# Patient Record
Sex: FEMALE | Race: BLACK OR AFRICAN AMERICAN | NOT HISPANIC OR LATINO
[De-identification: names, ages, dates, MRNs, and addresses within clinical notes are randomized per-mention and may not be internally consistent; named-entity substitution may affect disease eponyms.]

---

## 2017-04-20 PROBLEM — S83.209A TORN MENISCUS: Status: RESOLVED | Noted: 2017-04-20 | Resolved: 2017-04-20

## 2017-04-20 PROBLEM — M19.90 ARTHRITIS: Status: ACTIVE | Noted: 2017-04-20

## 2017-04-20 PROBLEM — K21.9 ACID REFLUX: Status: ACTIVE | Noted: 2017-04-20

## 2017-04-20 PROBLEM — Z86.2 HISTORY OF ANEMIA: Status: RESOLVED | Noted: 2017-04-20 | Resolved: 2017-04-20

## 2017-04-20 PROBLEM — Z87.898 HISTORY OF PREDIABETES: Status: RESOLVED | Noted: 2017-04-20 | Resolved: 2017-04-20

## 2017-04-20 PROBLEM — Z82.49 FAMILY HISTORY OF HYPERTENSION: Status: ACTIVE | Noted: 2017-04-20

## 2017-04-20 PROBLEM — R60.0 LOWER EXTREMITY EDEMA: Status: ACTIVE | Noted: 2017-04-20

## 2017-04-20 PROBLEM — F43.10 PTSD (POST-TRAUMATIC STRESS DISORDER): Status: RESOLVED | Noted: 2017-04-20 | Resolved: 2017-04-20

## 2017-04-20 PROBLEM — Z83.3 FAMILY HISTORY OF DIABETES MELLITUS: Status: ACTIVE | Noted: 2017-04-20

## 2017-04-20 RX ORDER — OMEPRAZOLE MAGNESIUM 20 MG/1
20 CAPSULE, DELAYED RELEASE ORAL
Refills: 0 | Status: ACTIVE | COMMUNITY

## 2017-04-24 ENCOUNTER — APPOINTMENT (OUTPATIENT)
Dept: SURGERY | Facility: CLINIC | Age: 37
End: 2017-04-24
Payer: COMMERCIAL

## 2017-04-24 VITALS
HEART RATE: 65 BPM | RESPIRATION RATE: 16 BRPM | TEMPERATURE: 98.3 F | SYSTOLIC BLOOD PRESSURE: 112 MMHG | BODY MASS INDEX: 44.1 KG/M2 | DIASTOLIC BLOOD PRESSURE: 76 MMHG | HEIGHT: 68 IN | OXYGEN SATURATION: 98 % | WEIGHT: 291 LBS

## 2017-04-24 DIAGNOSIS — F43.10 POST-TRAUMATIC STRESS DISORDER, UNSPECIFIED: ICD-10-CM

## 2017-04-24 DIAGNOSIS — M54.5 LOW BACK PAIN: ICD-10-CM

## 2017-04-24 DIAGNOSIS — R60.0 LOCALIZED EDEMA: ICD-10-CM

## 2017-04-24 DIAGNOSIS — Z82.49 FAMILY HISTORY OF ISCHEMIC HEART DISEASE AND OTHER DISEASES OF THE CIRCULATORY SYSTEM: ICD-10-CM

## 2017-04-24 DIAGNOSIS — S83.209A UNSPECIFIED TEAR OF UNSPECIFIED MENISCUS, CURRENT INJURY, UNSPECIFIED KNEE, INITIAL ENCOUNTER: ICD-10-CM

## 2017-04-24 DIAGNOSIS — M19.90 UNSPECIFIED OSTEOARTHRITIS, UNSPECIFIED SITE: ICD-10-CM

## 2017-04-24 DIAGNOSIS — Z83.3 FAMILY HISTORY OF DIABETES MELLITUS: ICD-10-CM

## 2017-04-24 DIAGNOSIS — Z86.2 PERSONAL HISTORY OF DISEASES OF THE BLOOD AND BLOOD-FORMING ORGANS AND CERTAIN DISORDERS INVOLVING THE IMMUNE MECHANISM: ICD-10-CM

## 2017-04-24 DIAGNOSIS — K21.9 GASTRO-ESOPHAGEAL REFLUX DISEASE W/OUT ESOPHAGITIS: ICD-10-CM

## 2017-04-24 DIAGNOSIS — Z87.898 PERSONAL HISTORY OF OTHER SPECIFIED CONDITIONS: ICD-10-CM

## 2017-04-24 PROCEDURE — 99245 OFF/OP CONSLTJ NEW/EST HI 55: CPT

## 2017-08-18 ENCOUNTER — APPOINTMENT (OUTPATIENT)
Dept: SURGERY | Facility: CLINIC | Age: 37
End: 2017-08-18
Payer: COMMERCIAL

## 2017-08-18 ENCOUNTER — OUTPATIENT (OUTPATIENT)
Dept: OUTPATIENT SERVICES | Facility: HOSPITAL | Age: 37
LOS: 1 days | End: 2017-08-18
Payer: COMMERCIAL

## 2017-08-18 VITALS
HEART RATE: 84 BPM | RESPIRATION RATE: 16 BRPM | HEIGHT: 68 IN | DIASTOLIC BLOOD PRESSURE: 80 MMHG | TEMPERATURE: 98 F | WEIGHT: 276.9 LBS | OXYGEN SATURATION: 99 % | SYSTOLIC BLOOD PRESSURE: 120 MMHG

## 2017-08-18 DIAGNOSIS — Z98.890 OTHER SPECIFIED POSTPROCEDURAL STATES: Chronic | ICD-10-CM

## 2017-08-18 DIAGNOSIS — E66.01 MORBID (SEVERE) OBESITY DUE TO EXCESS CALORIES: ICD-10-CM

## 2017-08-18 DIAGNOSIS — Z01.818 ENCOUNTER FOR OTHER PREPROCEDURAL EXAMINATION: ICD-10-CM

## 2017-08-18 DIAGNOSIS — Z98.891 HISTORY OF UTERINE SCAR FROM PREVIOUS SURGERY: Chronic | ICD-10-CM

## 2017-08-18 LAB
ALBUMIN SERPL ELPH-MCNC: 4.1 G/DL — SIGNIFICANT CHANGE UP (ref 3.3–5)
ALP SERPL-CCNC: 73 U/L — SIGNIFICANT CHANGE UP (ref 40–120)
ALT FLD-CCNC: 9 U/L — LOW (ref 10–45)
ANION GAP SERPL CALC-SCNC: 15 MMOL/L — SIGNIFICANT CHANGE UP (ref 5–17)
AST SERPL-CCNC: 18 U/L — SIGNIFICANT CHANGE UP (ref 10–40)
BILIRUB SERPL-MCNC: 0.2 MG/DL — SIGNIFICANT CHANGE UP (ref 0.2–1.2)
BLD GP AB SCN SERPL QL: NEGATIVE — SIGNIFICANT CHANGE UP
BUN SERPL-MCNC: 26 MG/DL — HIGH (ref 7–23)
CALCIUM SERPL-MCNC: 9.5 MG/DL — SIGNIFICANT CHANGE UP (ref 8.4–10.5)
CHLORIDE SERPL-SCNC: 97 MMOL/L — SIGNIFICANT CHANGE UP (ref 96–108)
CO2 SERPL-SCNC: 23 MMOL/L — SIGNIFICANT CHANGE UP (ref 22–31)
CREAT SERPL-MCNC: 1.48 MG/DL — HIGH (ref 0.5–1.3)
GLUCOSE SERPL-MCNC: 81 MG/DL — SIGNIFICANT CHANGE UP (ref 70–99)
HBA1C BLD-MCNC: 5.6 % — SIGNIFICANT CHANGE UP (ref 4–5.6)
HCG SERPL-ACNC: <1 MIU/ML — SIGNIFICANT CHANGE UP
HCT VFR BLD CALC: 40.1 % — SIGNIFICANT CHANGE UP (ref 34.5–45)
HGB BLD-MCNC: 13.1 G/DL — SIGNIFICANT CHANGE UP (ref 11.5–15.5)
MCHC RBC-ENTMCNC: 28.1 PG — SIGNIFICANT CHANGE UP (ref 27–34)
MCHC RBC-ENTMCNC: 32.7 GM/DL — SIGNIFICANT CHANGE UP (ref 32–36)
MCV RBC AUTO: 85.9 FL — SIGNIFICANT CHANGE UP (ref 80–100)
PLATELET # BLD AUTO: 442 K/UL — HIGH (ref 150–400)
POTASSIUM SERPL-MCNC: 4.4 MMOL/L — SIGNIFICANT CHANGE UP (ref 3.5–5.3)
POTASSIUM SERPL-SCNC: 4.4 MMOL/L — SIGNIFICANT CHANGE UP (ref 3.5–5.3)
PROT SERPL-MCNC: 8.6 G/DL — HIGH (ref 6–8.3)
RBC # BLD: 4.67 M/UL — SIGNIFICANT CHANGE UP (ref 3.8–5.2)
RBC # FLD: 15.4 % — HIGH (ref 10.3–14.5)
RH IG SCN BLD-IMP: POSITIVE — SIGNIFICANT CHANGE UP
SODIUM SERPL-SCNC: 135 MMOL/L — SIGNIFICANT CHANGE UP (ref 135–145)
WBC # BLD: 7.21 K/UL — SIGNIFICANT CHANGE UP (ref 3.8–10.5)
WBC # FLD AUTO: 7.21 K/UL — SIGNIFICANT CHANGE UP (ref 3.8–10.5)

## 2017-08-18 PROCEDURE — 83036 HEMOGLOBIN GLYCOSYLATED A1C: CPT

## 2017-08-18 PROCEDURE — G0463: CPT

## 2017-08-18 PROCEDURE — 99215 OFFICE O/P EST HI 40 MIN: CPT

## 2017-08-18 PROCEDURE — 84702 CHORIONIC GONADOTROPIN TEST: CPT

## 2017-08-18 PROCEDURE — 80053 COMPREHEN METABOLIC PANEL: CPT

## 2017-08-18 PROCEDURE — 86901 BLOOD TYPING SEROLOGIC RH(D): CPT

## 2017-08-18 PROCEDURE — 86900 BLOOD TYPING SEROLOGIC ABO: CPT

## 2017-08-18 PROCEDURE — 86850 RBC ANTIBODY SCREEN: CPT

## 2017-08-18 PROCEDURE — 85027 COMPLETE CBC AUTOMATED: CPT

## 2017-08-18 RX ORDER — CEFAZOLIN SODIUM 1 G
3000 VIAL (EA) INJECTION ONCE
Qty: 0 | Refills: 0 | Status: DISCONTINUED | OUTPATIENT
Start: 2017-08-23 | End: 2017-08-24

## 2017-08-18 NOTE — H&P PST ADULT - HISTORY OF PRESENT ILLNESS
37 year old female with h/o morbid obesity, GERD, OA- attempted multiple weight loss programs. Pt had surgical consult- scheduled for laparoscopic vertical sleeve gastrectomy on 08/23/2017.

## 2017-08-18 NOTE — H&P PST ADULT - PMH
Gastroesophageal reflux disease, esophagitis presence not specified    Morbid obesity due to excess calories    Osteoarthritis of left knee, unspecified osteoarthritis type

## 2017-08-18 NOTE — H&P PST ADULT - NSANTHOSAYNRD_GEN_A_CORE
No. TERENCE screening performed.  STOP BANG Legend: 0-2 = LOW Risk; 3-4 = INTERMEDIATE Risk; 5-8 = HIGH Risk

## 2017-08-23 ENCOUNTER — TRANSCRIPTION ENCOUNTER (OUTPATIENT)
Age: 37
End: 2017-08-23

## 2017-08-23 ENCOUNTER — RESULT REVIEW (OUTPATIENT)
Age: 37
End: 2017-08-23

## 2017-08-23 ENCOUNTER — INPATIENT (INPATIENT)
Facility: HOSPITAL | Age: 37
LOS: 0 days | Discharge: HOME | DRG: 621 | End: 2017-08-24
Attending: SURGERY | Admitting: SURGERY
Payer: COMMERCIAL

## 2017-08-23 ENCOUNTER — APPOINTMENT (OUTPATIENT)
Dept: SURGERY | Facility: HOSPITAL | Age: 37
End: 2017-08-23
Payer: COMMERCIAL

## 2017-08-23 VITALS
HEART RATE: 87 BPM | SYSTOLIC BLOOD PRESSURE: 126 MMHG | RESPIRATION RATE: 18 BRPM | WEIGHT: 267.42 LBS | OXYGEN SATURATION: 97 % | DIASTOLIC BLOOD PRESSURE: 79 MMHG | HEIGHT: 63 IN | TEMPERATURE: 98 F

## 2017-08-23 DIAGNOSIS — Z98.890 OTHER SPECIFIED POSTPROCEDURAL STATES: Chronic | ICD-10-CM

## 2017-08-23 DIAGNOSIS — E66.01 MORBID (SEVERE) OBESITY DUE TO EXCESS CALORIES: ICD-10-CM

## 2017-08-23 DIAGNOSIS — Z98.891 HISTORY OF UTERINE SCAR FROM PREVIOUS SURGERY: Chronic | ICD-10-CM

## 2017-08-23 LAB
ANION GAP SERPL CALC-SCNC: 17 MMOL/L — SIGNIFICANT CHANGE UP (ref 5–17)
BASOPHILS # BLD AUTO: 0.1 K/UL — SIGNIFICANT CHANGE UP (ref 0–0.2)
BASOPHILS NFR BLD AUTO: 0.9 % — SIGNIFICANT CHANGE UP (ref 0–2)
BUN SERPL-MCNC: 13 MG/DL — SIGNIFICANT CHANGE UP (ref 7–23)
CALCIUM SERPL-MCNC: 8.6 MG/DL — SIGNIFICANT CHANGE UP (ref 8.4–10.5)
CHLORIDE SERPL-SCNC: 101 MMOL/L — SIGNIFICANT CHANGE UP (ref 96–108)
CO2 SERPL-SCNC: 20 MMOL/L — LOW (ref 22–31)
CREAT SERPL-MCNC: 0.95 MG/DL — SIGNIFICANT CHANGE UP (ref 0.5–1.3)
EOSINOPHIL # BLD AUTO: 0.1 K/UL — SIGNIFICANT CHANGE UP (ref 0–0.5)
EOSINOPHIL NFR BLD AUTO: 0.6 % — SIGNIFICANT CHANGE UP (ref 0–6)
GLUCOSE SERPL-MCNC: 128 MG/DL — HIGH (ref 70–99)
HCG UR QL: NEGATIVE — SIGNIFICANT CHANGE UP
HCT VFR BLD CALC: 37.3 % — SIGNIFICANT CHANGE UP (ref 34.5–45)
HGB BLD-MCNC: 12.4 G/DL — SIGNIFICANT CHANGE UP (ref 11.5–15.5)
LYMPHOCYTES # BLD AUTO: 28.1 % — SIGNIFICANT CHANGE UP (ref 13–44)
LYMPHOCYTES # BLD AUTO: 3.5 K/UL — HIGH (ref 1–3.3)
MAGNESIUM SERPL-MCNC: 2 MG/DL — SIGNIFICANT CHANGE UP (ref 1.6–2.6)
MCHC RBC-ENTMCNC: 30 PG — SIGNIFICANT CHANGE UP (ref 27–34)
MCHC RBC-ENTMCNC: 33.3 GM/DL — SIGNIFICANT CHANGE UP (ref 32–36)
MCV RBC AUTO: 90 FL — SIGNIFICANT CHANGE UP (ref 80–100)
MONOCYTES # BLD AUTO: 0.3 K/UL — SIGNIFICANT CHANGE UP (ref 0–0.9)
MONOCYTES NFR BLD AUTO: 2.5 % — SIGNIFICANT CHANGE UP (ref 2–14)
NEUTROPHILS # BLD AUTO: 8.5 K/UL — HIGH (ref 1.8–7.4)
NEUTROPHILS NFR BLD AUTO: 67.9 % — SIGNIFICANT CHANGE UP (ref 43–77)
PHOSPHATE SERPL-MCNC: 3.1 MG/DL — SIGNIFICANT CHANGE UP (ref 2.5–4.5)
PLATELET # BLD AUTO: 351 K/UL — SIGNIFICANT CHANGE UP (ref 150–400)
POTASSIUM SERPL-MCNC: 3.9 MMOL/L — SIGNIFICANT CHANGE UP (ref 3.5–5.3)
POTASSIUM SERPL-SCNC: 3.9 MMOL/L — SIGNIFICANT CHANGE UP (ref 3.5–5.3)
RBC # BLD: 4.14 M/UL — SIGNIFICANT CHANGE UP (ref 3.8–5.2)
RBC # FLD: 13.8 % — SIGNIFICANT CHANGE UP (ref 10.3–14.5)
RH IG SCN BLD-IMP: POSITIVE — SIGNIFICANT CHANGE UP
SODIUM SERPL-SCNC: 138 MMOL/L — SIGNIFICANT CHANGE UP (ref 135–145)
WBC # BLD: 12.6 K/UL — HIGH (ref 3.8–10.5)
WBC # FLD AUTO: 12.6 K/UL — HIGH (ref 3.8–10.5)

## 2017-08-23 PROCEDURE — 49585: CPT | Mod: 59

## 2017-08-23 PROCEDURE — 43775 LAP SLEEVE GASTRECTOMY: CPT

## 2017-08-23 PROCEDURE — 88305 TISSUE EXAM BY PATHOLOGIST: CPT | Mod: 26

## 2017-08-23 RX ORDER — HEPARIN SODIUM 5000 [USP'U]/ML
5000 INJECTION INTRAVENOUS; SUBCUTANEOUS ONCE
Qty: 0 | Refills: 0 | Status: COMPLETED | OUTPATIENT
Start: 2017-08-23 | End: 2017-08-23

## 2017-08-23 RX ORDER — CEFAZOLIN SODIUM 1 G
3000 VIAL (EA) INJECTION EVERY 8 HOURS
Qty: 0 | Refills: 0 | Status: COMPLETED | OUTPATIENT
Start: 2017-08-23 | End: 2017-08-23

## 2017-08-23 RX ORDER — POTASSIUM CHLORIDE 20 MEQ
10 PACKET (EA) ORAL
Qty: 0 | Refills: 0 | Status: COMPLETED | OUTPATIENT
Start: 2017-08-23 | End: 2017-08-23

## 2017-08-23 RX ORDER — ONDANSETRON 8 MG/1
4 TABLET, FILM COATED ORAL EVERY 6 HOURS
Qty: 0 | Refills: 0 | Status: DISCONTINUED | OUTPATIENT
Start: 2017-08-23 | End: 2017-08-24

## 2017-08-23 RX ORDER — SODIUM CHLORIDE 9 MG/ML
3 INJECTION INTRAMUSCULAR; INTRAVENOUS; SUBCUTANEOUS EVERY 8 HOURS
Qty: 0 | Refills: 0 | Status: DISCONTINUED | OUTPATIENT
Start: 2017-08-23 | End: 2017-08-23

## 2017-08-23 RX ORDER — HYOSCYAMINE SULFATE 0.13 MG
0.12 TABLET ORAL EVERY 6 HOURS
Qty: 0 | Refills: 0 | Status: DISCONTINUED | OUTPATIENT
Start: 2017-08-23 | End: 2017-08-24

## 2017-08-23 RX ORDER — KETOROLAC TROMETHAMINE 30 MG/ML
30 SYRINGE (ML) INJECTION EVERY 6 HOURS
Qty: 0 | Refills: 0 | Status: DISCONTINUED | OUTPATIENT
Start: 2017-08-23 | End: 2017-08-23

## 2017-08-23 RX ORDER — ACETAMINOPHEN 500 MG
1000 TABLET ORAL ONCE
Qty: 0 | Refills: 0 | Status: COMPLETED | OUTPATIENT
Start: 2017-08-23 | End: 2017-08-24

## 2017-08-23 RX ORDER — HYDROMORPHONE HYDROCHLORIDE 2 MG/ML
0.51 INJECTION INTRAMUSCULAR; INTRAVENOUS; SUBCUTANEOUS
Qty: 0 | Refills: 0 | Status: DISCONTINUED | OUTPATIENT
Start: 2017-08-23 | End: 2017-08-23

## 2017-08-23 RX ORDER — HEPARIN SODIUM 5000 [USP'U]/ML
5000 INJECTION INTRAVENOUS; SUBCUTANEOUS EVERY 8 HOURS
Qty: 0 | Refills: 0 | Status: DISCONTINUED | OUTPATIENT
Start: 2017-08-23 | End: 2017-08-24

## 2017-08-23 RX ORDER — PANTOPRAZOLE SODIUM 20 MG/1
40 TABLET, DELAYED RELEASE ORAL DAILY
Qty: 0 | Refills: 0 | Status: DISCONTINUED | OUTPATIENT
Start: 2017-08-23 | End: 2017-08-24

## 2017-08-23 RX ORDER — ACETAMINOPHEN 500 MG
1000 TABLET ORAL ONCE
Qty: 0 | Refills: 0 | Status: COMPLETED | OUTPATIENT
Start: 2017-08-23 | End: 2017-08-23

## 2017-08-23 RX ORDER — HYDROMORPHONE HYDROCHLORIDE 2 MG/ML
0.5 INJECTION INTRAMUSCULAR; INTRAVENOUS; SUBCUTANEOUS
Qty: 0 | Refills: 0 | Status: DISCONTINUED | OUTPATIENT
Start: 2017-08-23 | End: 2017-08-24

## 2017-08-23 RX ORDER — SODIUM CHLORIDE 9 MG/ML
1000 INJECTION, SOLUTION INTRAVENOUS
Qty: 0 | Refills: 0 | Status: DISCONTINUED | OUTPATIENT
Start: 2017-08-23 | End: 2017-08-24

## 2017-08-23 RX ADMIN — HEPARIN SODIUM 5000 UNIT(S): 5000 INJECTION INTRAVENOUS; SUBCUTANEOUS at 14:11

## 2017-08-23 RX ADMIN — Medication 0.12 MILLIGRAM(S): at 23:59

## 2017-08-23 RX ADMIN — Medication 200 MILLIGRAM(S): at 23:59

## 2017-08-23 RX ADMIN — Medication 0.12 MILLIGRAM(S): at 17:19

## 2017-08-23 RX ADMIN — HYDROMORPHONE HYDROCHLORIDE 0.5 MILLIGRAM(S): 2 INJECTION INTRAMUSCULAR; INTRAVENOUS; SUBCUTANEOUS at 12:37

## 2017-08-23 RX ADMIN — Medication 30 MILLIGRAM(S): at 15:17

## 2017-08-23 RX ADMIN — HYDROMORPHONE HYDROCHLORIDE 0.51 MILLIGRAM(S): 2 INJECTION INTRAMUSCULAR; INTRAVENOUS; SUBCUTANEOUS at 09:55

## 2017-08-23 RX ADMIN — HEPARIN SODIUM 5000 UNIT(S): 5000 INJECTION INTRAVENOUS; SUBCUTANEOUS at 06:09

## 2017-08-23 RX ADMIN — ONDANSETRON 4 MILLIGRAM(S): 8 TABLET, FILM COATED ORAL at 11:37

## 2017-08-23 RX ADMIN — Medication 30 MILLIGRAM(S): at 15:12

## 2017-08-23 RX ADMIN — ONDANSETRON 4 MILLIGRAM(S): 8 TABLET, FILM COATED ORAL at 17:19

## 2017-08-23 RX ADMIN — Medication 200 MILLIGRAM(S): at 16:40

## 2017-08-23 RX ADMIN — Medication 400 MILLIGRAM(S): at 15:12

## 2017-08-23 RX ADMIN — PANTOPRAZOLE SODIUM 40 MILLIGRAM(S): 20 TABLET, DELAYED RELEASE ORAL at 11:37

## 2017-08-23 RX ADMIN — HYDROMORPHONE HYDROCHLORIDE 0.5 MILLIGRAM(S): 2 INJECTION INTRAMUSCULAR; INTRAVENOUS; SUBCUTANEOUS at 13:00

## 2017-08-23 RX ADMIN — Medication 400 MILLIGRAM(S): at 22:12

## 2017-08-23 RX ADMIN — Medication 1000 MILLIGRAM(S): at 22:38

## 2017-08-23 RX ADMIN — HEPARIN SODIUM 5000 UNIT(S): 5000 INJECTION INTRAVENOUS; SUBCUTANEOUS at 22:12

## 2017-08-23 RX ADMIN — SODIUM CHLORIDE 150 MILLILITER(S): 9 INJECTION, SOLUTION INTRAVENOUS at 10:06

## 2017-08-23 RX ADMIN — ONDANSETRON 4 MILLIGRAM(S): 8 TABLET, FILM COATED ORAL at 23:59

## 2017-08-23 RX ADMIN — Medication 0.12 MILLIGRAM(S): at 11:37

## 2017-08-23 RX ADMIN — Medication 1000 MILLIGRAM(S): at 15:16

## 2017-08-23 RX ADMIN — HYDROMORPHONE HYDROCHLORIDE 0.51 MILLIGRAM(S): 2 INJECTION INTRAMUSCULAR; INTRAVENOUS; SUBCUTANEOUS at 10:25

## 2017-08-23 NOTE — PHARMACY COMMUNICATION NOTE - COMMENTS
Patient medication reconciliation done. Patient currently taking: no medications .Patient was re-educated to take daily multi-vitamins post surgically. Patient re-educated on NSAID avoidance (Ibuprofen, ASA, naproxen, alleve) as they increased risk of GI bleeding. Patient educated to use APAP for mild pain otherwise contact prescriber for consult. Patient medication reconciliation done. Patient currently taking: Probiotic capsule. Patient was re-educated to take daily multi-vitamins post surgically. Patient re-educated on NSAID avoidance (Ibuprofen, ASA, naproxen, alleve) as they increased risk of GI bleeding. Patient educated to use APAP for mild pain otherwise contact prescriber for consult.  Patient advised to either open probiotic capsules and sprinkle content on to sugar free applesauce or stop it for 1 month

## 2017-08-23 NOTE — BRIEF OPERATIVE NOTE - OPERATION/FINDINGS
Sleeve gastrectomy performed over 36-Fr calibration tube. Reducible umbilical hernia repaired primarily.

## 2017-08-23 NOTE — PROGRESS NOTE ADULT - ASSESSMENT
36yo F POD 0 s/p lap vertical sleeve and umbilical hernia repair    -Pain control  -bariatric medications  -IVF  -CLD in AM  -Plan on removing khalil if UO continues to be adequate  -OOB/ISS  -DVT ppx  -AM labs  -transfer to floor when PACU criteria met

## 2017-08-24 ENCOUNTER — TRANSCRIPTION ENCOUNTER (OUTPATIENT)
Age: 37
End: 2017-08-24

## 2017-08-24 VITALS
SYSTOLIC BLOOD PRESSURE: 134 MMHG | RESPIRATION RATE: 16 BRPM | HEART RATE: 54 BPM | DIASTOLIC BLOOD PRESSURE: 72 MMHG | TEMPERATURE: 98 F | OXYGEN SATURATION: 98 %

## 2017-08-24 DIAGNOSIS — R00.1 BRADYCARDIA, UNSPECIFIED: ICD-10-CM

## 2017-08-24 LAB
ANION GAP SERPL CALC-SCNC: 11 MMOL/L — SIGNIFICANT CHANGE UP (ref 5–17)
BASOPHILS # BLD AUTO: 0.1 K/UL — SIGNIFICANT CHANGE UP (ref 0–0.2)
BASOPHILS NFR BLD AUTO: 0.7 % — SIGNIFICANT CHANGE UP (ref 0–2)
BUN SERPL-MCNC: 10 MG/DL — SIGNIFICANT CHANGE UP (ref 7–23)
CALCIUM SERPL-MCNC: 8.8 MG/DL — SIGNIFICANT CHANGE UP (ref 8.4–10.5)
CHLORIDE SERPL-SCNC: 104 MMOL/L — SIGNIFICANT CHANGE UP (ref 96–108)
CO2 SERPL-SCNC: 23 MMOL/L — SIGNIFICANT CHANGE UP (ref 22–31)
CREAT SERPL-MCNC: 0.89 MG/DL — SIGNIFICANT CHANGE UP (ref 0.5–1.3)
EOSINOPHIL # BLD AUTO: 0.1 K/UL — SIGNIFICANT CHANGE UP (ref 0–0.5)
EOSINOPHIL NFR BLD AUTO: 1 % — SIGNIFICANT CHANGE UP (ref 0–6)
GLUCOSE SERPL-MCNC: 94 MG/DL — SIGNIFICANT CHANGE UP (ref 70–99)
HCT VFR BLD CALC: 36.1 % — SIGNIFICANT CHANGE UP (ref 34.5–45)
HGB BLD-MCNC: 11.9 G/DL — SIGNIFICANT CHANGE UP (ref 11.5–15.5)
LYMPHOCYTES # BLD AUTO: 2.4 K/UL — SIGNIFICANT CHANGE UP (ref 1–3.3)
LYMPHOCYTES # BLD AUTO: 30.3 % — SIGNIFICANT CHANGE UP (ref 13–44)
MCHC RBC-ENTMCNC: 29.5 PG — SIGNIFICANT CHANGE UP (ref 27–34)
MCHC RBC-ENTMCNC: 33.1 GM/DL — SIGNIFICANT CHANGE UP (ref 32–36)
MCV RBC AUTO: 89.3 FL — SIGNIFICANT CHANGE UP (ref 80–100)
MONOCYTES # BLD AUTO: 0.6 K/UL — SIGNIFICANT CHANGE UP (ref 0–0.9)
MONOCYTES NFR BLD AUTO: 8.1 % — SIGNIFICANT CHANGE UP (ref 2–14)
NEUTROPHILS # BLD AUTO: 4.8 K/UL — SIGNIFICANT CHANGE UP (ref 1.8–7.4)
NEUTROPHILS NFR BLD AUTO: 59.9 % — SIGNIFICANT CHANGE UP (ref 43–77)
PLATELET # BLD AUTO: 316 K/UL — SIGNIFICANT CHANGE UP (ref 150–400)
POTASSIUM SERPL-MCNC: 4.1 MMOL/L — SIGNIFICANT CHANGE UP (ref 3.5–5.3)
POTASSIUM SERPL-SCNC: 4.1 MMOL/L — SIGNIFICANT CHANGE UP (ref 3.5–5.3)
RBC # BLD: 4.05 M/UL — SIGNIFICANT CHANGE UP (ref 3.8–5.2)
RBC # FLD: 14 % — SIGNIFICANT CHANGE UP (ref 10.3–14.5)
SODIUM SERPL-SCNC: 138 MMOL/L — SIGNIFICANT CHANGE UP (ref 135–145)
WBC # BLD: 8 K/UL — SIGNIFICANT CHANGE UP (ref 3.8–10.5)
WBC # FLD AUTO: 8 K/UL — SIGNIFICANT CHANGE UP (ref 3.8–10.5)

## 2017-08-24 PROCEDURE — 83735 ASSAY OF MAGNESIUM: CPT

## 2017-08-24 PROCEDURE — 93005 ELECTROCARDIOGRAM TRACING: CPT

## 2017-08-24 PROCEDURE — 86901 BLOOD TYPING SEROLOGIC RH(D): CPT

## 2017-08-24 PROCEDURE — 80048 BASIC METABOLIC PNL TOTAL CA: CPT

## 2017-08-24 PROCEDURE — 86900 BLOOD TYPING SEROLOGIC ABO: CPT

## 2017-08-24 PROCEDURE — 88305 TISSUE EXAM BY PATHOLOGIST: CPT

## 2017-08-24 PROCEDURE — 85027 COMPLETE CBC AUTOMATED: CPT

## 2017-08-24 PROCEDURE — 93010 ELECTROCARDIOGRAM REPORT: CPT

## 2017-08-24 PROCEDURE — C1889: CPT

## 2017-08-24 PROCEDURE — 81025 URINE PREGNANCY TEST: CPT

## 2017-08-24 PROCEDURE — 84100 ASSAY OF PHOSPHORUS: CPT

## 2017-08-24 RX ORDER — OXYCODONE HYDROCHLORIDE 5 MG/1
5 TABLET ORAL
Qty: 120 | Refills: 0 | OUTPATIENT
Start: 2017-08-24 | End: 2017-08-28

## 2017-08-24 RX ORDER — ONDANSETRON 8 MG/1
1 TABLET, FILM COATED ORAL
Qty: 21 | Refills: 0 | OUTPATIENT
Start: 2017-08-24 | End: 2017-08-31

## 2017-08-24 RX ORDER — OMEPRAZOLE 10 MG/1
1 CAPSULE, DELAYED RELEASE ORAL
Qty: 30 | Refills: 0 | OUTPATIENT
Start: 2017-08-24 | End: 2017-09-23

## 2017-08-24 RX ORDER — KETOROLAC TROMETHAMINE 30 MG/ML
30 SYRINGE (ML) INJECTION EVERY 6 HOURS
Qty: 0 | Refills: 0 | Status: COMPLETED | OUTPATIENT
Start: 2017-08-24 | End: 2017-08-24

## 2017-08-24 RX ORDER — OXYCODONE HYDROCHLORIDE 5 MG/1
5 TABLET ORAL EVERY 4 HOURS
Qty: 0 | Refills: 0 | Status: DISCONTINUED | OUTPATIENT
Start: 2017-08-24 | End: 2017-08-24

## 2017-08-24 RX ADMIN — ONDANSETRON 4 MILLIGRAM(S): 8 TABLET, FILM COATED ORAL at 05:44

## 2017-08-24 RX ADMIN — Medication 400 MILLIGRAM(S): at 08:01

## 2017-08-24 RX ADMIN — HEPARIN SODIUM 5000 UNIT(S): 5000 INJECTION INTRAVENOUS; SUBCUTANEOUS at 05:44

## 2017-08-24 RX ADMIN — Medication 0.12 MILLIGRAM(S): at 05:44

## 2017-08-24 RX ADMIN — Medication 30 MILLIGRAM(S): at 08:57

## 2017-08-24 RX ADMIN — Medication 1000 MILLIGRAM(S): at 08:16

## 2017-08-24 RX ADMIN — HEPARIN SODIUM 5000 UNIT(S): 5000 INJECTION INTRAVENOUS; SUBCUTANEOUS at 13:54

## 2017-08-24 RX ADMIN — Medication 30 MILLIGRAM(S): at 08:42

## 2017-08-24 NOTE — CONSULT NOTE ADULT - ASSESSMENT
37 F h/o prediabetes, morbid obesity, GERD, OA, PTSD, depression, anemia, dyslipidemia who is POD # 1 s/p laparoscopic vertical sleeve gastrectomy with asymptomatic sinus bradycardia in the post-operative period.

## 2017-08-24 NOTE — DISCHARGE NOTE ADULT - MEDICATION SUMMARY - MEDICATIONS TO TAKE
I will START or STAY ON the medications listed below when I get home from the hospital:    oxyCODONE 5 mg/5 mL oral solution  -- 5 milliliter(s) by mouth every 4 hours MDD:30  -- Caution federal law prohibits the transfer of this drug to any person other  than the person for whom it was prescribed.  May cause drowsiness.  Alcohol may intensify this effect.  Use care when operating dangerous machinery.  This prescription cannot be refilled.  Using more of this medication than prescribed may cause serious breathing problems.    -- Indication: For pain    Zofran ODT 4 mg oral tablet, disintegrating  -- 1 tab(s) by mouth 3 times a day as needed MDD:3  -- Indication: For nausea    omeprazole 40 mg oral delayed release capsule  -- 1 cap(s) by mouth once a day MDD:1 do not swallow whole, open capsule and mix in unsweetned applesauce  -- do not swallow whole,mix content in applesauce  -- Indication: For reflux    multivitamin  -- 4   once a day  -- Indication: For dietary supplement

## 2017-08-24 NOTE — DISCHARGE NOTE ADULT - HOSPITAL COURSE
On August 23 Ms. Figueroa, 37 year old obese female underwent laparoscopic Sleeve Gastrectomy for BMI 47. Prior to start of procedure, she received VTE and  SSI prophylaxis. Following induction, indwelling khalil catheter placed. The procedure went as planned, and intraoperative leak test showed no evidence of leak. She was subsequently extubated in the OR and taken to the recovery room. Her pain was managed with multi-modal non-opiod analgesia postoperatively. Once hemodynamically stable, she ambulated with assistance. She was later transferred to the bariatric unit and placed on bedside continuous pulse oximetry. Khalil removed and spontaneous return of bladder function.  On POD#1, she remained stable, had effective pain control, ambulating independently. She was advanced to bariatric clear liquid diet and she tolerated it. The rest of her hospital course was uneventful and she was subsequently cleared for discharge. Procedure specific written discharge instructions including VTE patient education explained, written materials provided. Routine bariatric medications obtained from pharmacy prior to discharge. She was advised to begin bariatric full liquid diet on POD#2 for a period of 2 weeks and follow up with her dietitian in 30 days. She will follow up with Dr Fleming in 7-10 days for her first post-operative visit with subsequent follow up at one, three, six months and then annually.

## 2017-08-24 NOTE — CONSULT NOTE ADULT - PROBLEM SELECTOR RECOMMENDATION 9
-  - EKG's reviewed as noted above.  - pt is asymptomatic and normotensive.  - HR now up to the 50's on vitals.  - No further cardiac work-up needed at this time.  - Can follow-up with me as an outpatient to reassess within 2 weeks of discharge.    Call with questions.    A total of 80 minutes of face-to-face time was spent with the patient during this encounter -over half of that time was spent in counseling and coordination of care.     Iban Boucher M.D., [facility]  [phone] -  - EKG's reviewed as noted above.  - pt is asymptomatic and normotensive.  - Resting HR up to the 50's on vitals.  - Single lead ECG rhythm strip after ambulation shows normal sinus rhythm in the 70's.  - No further cardiac work-up needed at this time.  - Can follow-up with Dr. Taylor in the office to reassess within 2 weeks of discharge.    Call with questions.    A total of 80 minutes of face-to-face time was spent with the patient during this encounter -over half of that time was spent in counseling and coordination of care.     Iban Boucher M.D., [facility]  [phone]

## 2017-08-24 NOTE — DISCHARGE NOTE ADULT - CARE PROVIDER_API CALL
Jr Fleming (MD), Surgery  [address]  [address] [place] [id number]  Phone: [phone]  Fax: [phone]

## 2017-08-24 NOTE — DISCHARGE NOTE ADULT - PLAN OF CARE
Pt will continue healthy dietary practices for improve quality of life. call office for nasuea vomiting, fever, abdominal pain, redness and or drainage from abdominal surgical sites. if you experience shortness of breath, chest pain, calf pain with swelling please visit your nearest emergency room.

## 2017-08-24 NOTE — CONSULT NOTE ADULT - SUBJECTIVE AND OBJECTIVE BOX
[facility] Cardiology Consult  _________________________    CC: "her heart rate was slow after surgery" - PA      HPI:  37 F h/o prediabetes, morbid obesity, GERD, OA, PTSD, depression, anemia, dyslipidemia who is POD # 1 s/p laparoscopic vertical sleeve gastrectomy. We are consulted for sinus bradycardia noted in the post-operative period. The patient denies any dizziness, lightheadedness, presyncope or syncope.     PAST MEDICAL & SURGICAL HISTORY:  Gastroesophageal reflux disease, esophagitis presence not specified  Osteoarthritis of left knee, unspecified osteoarthritis type  Morbid obesity due to excess calories  H/O arthroscopy of left knee: 2015  History of : 2009      MEDICATIONS  (STANDING):  hyoscyamine SL 0.125 milliGRAM(s) SubLingual every 6 hours  heparin  Injectable 5000 Unit(s) SubCutaneous every 8 hours  lactated ringers. 1000 milliLiter(s) (150 mL/Hr) IV Continuous <Continuous>  ondansetron Injectable 4 milliGRAM(s) IV Push every 6 hours  pantoprazole  Injectable 40 milliGRAM(s) IV Push daily  ketorolac   Injectable 30 milliGRAM(s) IV Push every 6 hours    MEDICATIONS  (PRN):  aluminum hydroxide/magnesium hydroxide/simethicone Suspension 30 milliLiter(s) Oral every 4 hours PRN Dyspepsia  HYDROmorphone  Injectable 0.5 milliGRAM(s) IV Push every 10 minutes PRN Severe Pain (7 - 10)  oxyCODONE    Solution 5 milliGRAM(s) Oral every 4 hours PRN Mild Pain (1 - 3)      Allergies    No Known Allergies    Intolerances        Social Histroy: Tobacco- , ETOH-, Illicit Drugs-    T(C): 36.9 (17 @ 08:50), Max: 36.9 (17 @ 08:50)  HR: 52 (17 @ 08:50) (52 - 92)  BP: 130/66 (17 @ 08:50) (104/65 - 137/65)  RR: 16 (17 @ 08:50) (14 - 18)  SpO2: 100% (17 @ 08:50) (98% - 100%)  I&O's Summary    23 Aug 2017 07:01  -  24 Aug 2017 07:00  --------------------------------------------------------  IN: 750 mL / OUT: 1015 mL / NET: -265 mL    24 Aug 2017 07:  -  24 Aug 2017 10:33  --------------------------------------------------------  IN: 100 mL / OUT: 0 mL / NET: 100 mL        Review of Systems:  Constitutional: [ ] Fever [ ] Chills [ ] Fatigue [ ] Weight change   HEENT: [ ] Blurred vision [ ] Eye Pain [ ] Headache [ ] Runny nose [ ] Sore Throat   Respiratory: [ ] Cough [ ] Wheezing [ ] Shortness of breath  Cardiovascular: [ ] Chest Pain [ ] Palpitations [ ] KLEIN [ ] PND [ ] Orthopnea  Gastrointestinal: [ ] Abdominal Pain [ ] Diarrhea [ ] Constipation [ ] Hemorrhoids [ ] Nausea [ ] Vomiting  Genitourinary: [ ] Nocturia [ ] Dysuria [ ] Incontinence  Extremities: [ ] Swelling [ ] Joint Pain  Neurologic: [ ] Focal deficit [ ] Paresthesias [ ] Syncope  Lymphatic: [ ] Swelling [ ] Lymphadenopathy   Skin: [ ] Rash [ ] Ecchymoses [ ] Wounds [ ] Lesions  Psychiatry: [ ] Depression [ ] Suicidal/Homicidal Ideation [ ] Anxiety [ ] Sleep Disturbances  [ ] 10 point review of systems is otherwise negative except as mentioned above            [ ]Unable to obtain    PHYSICAL EXAM:  GENERAL: Alert, NAD  NECK: Supple, No JVD, No carotid bruit.  CHEST/LUNG: Clear to auscultation bilaterally; No wheezes, rales, or rhonchi  HEART: S1 S2 normal, RRR,  No murmurs, rubs, or gallops  ABDOMEN: Obese. Incisions c/d/i.  EXTREMITIES:  No LE edema.      LABS:                        11.9   8.0   )-----------( 316      ( 24 Aug 2017 10:12 )             36.1     08-    138  |  101  |  13  ----------------------------<  128<H>  3.9   |  20<L>  |  0.95    Ca    8.6      23 Aug 2017 10:09  Phos  3.1     08-  Mg     2.0     08-        MEDICATIONS  (STANDING):  hyoscyamine SL 0.125 milliGRAM(s) SubLingual every 6 hours  heparin  Injectable 5000 Unit(s) SubCutaneous every 8 hours  lactated ringers. 1000 milliLiter(s) (150 mL/Hr) IV Continuous <Continuous>  ondansetron Injectable 4 milliGRAM(s) IV Push every 6 hours  pantoprazole  Injectable 40 milliGRAM(s) IV Push daily  ketorolac   Injectable 30 milliGRAM(s) IV Push every 6 hours    MEDICATIONS  (PRN):  aluminum hydroxide/magnesium hydroxide/simethicone Suspension 30 milliLiter(s) Oral every 4 hours PRN Dyspepsia  HYDROmorphone  Injectable 0.5 milliGRAM(s) IV Push every 10 minutes PRN Severe Pain (7 - 10)  oxyCODONE    Solution 5 milliGRAM(s) Oral every 4 hours PRN Mild Pain (1 - 3)      RADIOLOGY & ADDITIONAL TESTS:    Cardiology testing:  EKG: sinus bradycardia at 45 BPM. No significant ST or T wave abnormality.

## 2017-08-24 NOTE — DISCHARGE NOTE ADULT - INSTRUCTIONS
continue bariatric clear diet today and start full liquid tomorrow. Avoid carbonated beverages and straws. Follow up with your dietitian in 30 days.

## 2017-08-24 NOTE — DISCHARGE NOTE ADULT - CARE PROVIDERS DIRECT ADDRESSES
[email or web address]

## 2017-08-24 NOTE — DISCHARGE NOTE ADULT - PATIENT PORTAL LINK FT
“You can access the [employer] Patient Portal, offered by [facility], by registering with the following website: [email or web address]”

## 2017-08-24 NOTE — PROGRESS NOTE ADULT - SUBJECTIVE AND OBJECTIVE BOX
Post Op Day#: 1    Subjective: " Feeling ok, denies chest pain and shortness of breath. Walked around  the unit independently.  Tolerated clear liquid diet    Objective: Pt with asymptomatic saw cardia overnight in low 50's. Baseline preop 78bpm. EKG obtained, Sinus bradycardia with arrythmia @  Rate of 44bpm.  Cardiology consult called and cardiologist evaluated the patient.  No further  recommendations for cardiac intervention and she was cleared pt from a cardiac perspective for discharge.  Pt tolerated bariatric clear liquid diet, has effective pain control and up ambulating.  V/S and labs stable    Vital Signs Last 24 Hrs  T(C): 36.9 (24 Aug 2017 08:50), Max: 36.9 (24 Aug 2017 08:50)  T(F): 98.4 (24 Aug 2017 08:50), Max: 98.4 (24 Aug 2017 08:50)  HR: 52 (24 Aug 2017 08:50) (52 - 92)  BP: 130/66 (24 Aug 2017 08:50) (104/65 - 136/78)  BP(mean): --  RR: 16 (24 Aug 2017 08:50) (16 - 16)  SpO2: 100% (24 Aug 2017 08:50) (98% - 100%)    24 Aug 2017 07:01  -  24 Aug 2017 13:43  ---------    Oral Fluid: 360  mL    OUT:    Voided: 800 mL  Total OUT: 800 mL    LABS:  CBC Full  -  ( 24 Aug 2017 10:12 )  WBC Count : 8.0 K/uL  Hemoglobin : 11.9 g/dL  Hematocrit : 36.1 %  Platelet Count - Automated : 316 K/uL    08-24    138  |  104  |  10  ----------------------------<  94  4.1   |  23  |  0.89    Ca    8.8      24 Aug 2017 10:12  Phos  3.1     08-23  Mg     2.0     08-23    Physical Exam:         Lungs:  clear breath sounds b/l       Heart:  Normal sinus at Regular rate & rhythm       Abdomen:  Soft, non-distended.  Scopes sites clean, dry and intact. + bs, - flatus, no rebound or guarding       Skin:  No rash       Extremities: + pulses, no edema, no calf tenderness, negative jose's                [facility] VTE Risk Score: 3-4 (moderate): No extended prophylaxis recommended post-discharge  [facility] VTE Risk Score: 8 (low) : No extended prophylaxis recommended post-discharge    Assessment and Plan:  37y y/o Female s/p Gastrectomy, laparoscopic sleeve Gastrectomy, laparoscopic sleeve  POD # !  - continue bariatric clear diet today and start full liquid tomorrow  - Continue DVT/ GI prophylaxis   - Dietary and procedure specific education including VTE post-op, written material given  - Medication reviewed and reconciled, Bariatric meds obtained from pharmacy prior to d/c  - D/c home once Bariatric 8-Point Discharge Criteria met  - F/u with Dr Fleming in 7-10 days, Dietitian and PMD in 30 days.      Brenda Winkler, [place], ANP  [phone]

## 2017-08-24 NOTE — DIETITIAN INITIAL EVALUATION ADULT. - ADHERENCE
Pt reports following a full liquid diet for 2 weeks PTA as instructed by outpatient RD. Pt reports having Premier protein shakes, sugar free jello and pudding, broths and water.

## 2017-08-24 NOTE — PROGRESS NOTE ADULT - ASSESSMENT
Assessment and Plan:  37y y/o Female s/p Gastrectomy, laparoscopic sleeve  , POD # 1    - Start PO liquid oxycodone PRN for pain  - Continue ambulation   - Encourage Incentive Spirometer use  - Bariatric clears   - Continue chemical and mechanical DVT prophylaxis  - Discharge home once tolerating adequate PO and 8 point discharge criteria met    Discuss with attending

## 2017-08-24 NOTE — PROGRESS NOTE ADULT - SUBJECTIVE AND OBJECTIVE BOX
Bariatric Surgery Progress Note    Post Op Day#: 1    24 hr events/Subjective: The patient had bradycardia overnight to the 40s, so an EKG is being performed this morning. She endorses that she has had a low HR in the past, but never lower than 50s. She is positive for flatus, but negative for bowel movements.    Pain controlled with medications  Nausea controlled with anti-emetics   Voiding    Procedure:  Gastrectomy, laparoscopic sleeve    Vital Signs Last 24 Hrs  T(C): 36.8 (24 Aug 2017 04:23), Max: 36.8 (23 Aug 2017 22:11)  T(F): 98.2 (24 Aug 2017 04:23), Max: 98.2 (23 Aug 2017 22:11)  HR: 56 (24 Aug 2017 04:23) (52 - 92)  BP: 104/65 (24 Aug 2017 04:23) (104/65 - 137/65)  BP(mean): 99 (23 Aug 2017 13:30) (88 - 101)  RR: 16 (24 Aug 2017 04:23) (14 - 18)  SpO2: 100% (24 Aug 2017 04:23) (98% - 100%)  Height (cm): 160.02 (08-23 @ 06:03)  Weight (kg): 121.3 (08-23 @ 06:03)  BMI (kg/m2): 47.4 (08-23 @ 06:03)  BSA (m2): 2.19 (08-23 @ 06:03)  I&O's Summary    23 Aug 2017 07:01  -  24 Aug 2017 07:00  --------------------------------------------------------  IN: 750 mL / OUT: 1015 mL / NET: -265 mL      I&O's Detail    23 Aug 2017 07:01  -  24 Aug 2017 07:00  --------------------------------------------------------  IN:    lactated ringers.: 750 mL  Total IN: 750 mL    OUT:    Indwelling Catheter - Urethral: 265 mL    Voided: 750 mL  Total OUT: 1015 mL    Total NET: -265 mL    Physical Exam:  General:  Appears stated age, well-groomed, well-nourished, no distress  Abdomen:  Soft, incisions clean, dry intact, tenderness around incisions, no rebound or guarding  Skin:  No rash  Neuro/Psych:  Alert, oriented to time, place and person                           12.4   12.6  )-----------( 351      ( 23 Aug 2017 10:09 )             37.3       08-23    138  |  101  |  13  ----------------------------<  128<H>  3.9   |  20<L>  |  0.95    Ca    8.6      23 Aug 2017 10:09  Phos  3.1     08-23  Mg     2.0     08-23        ceFAZolin   IVPB milliGRAM(s) IV Intermittent once  hyoscyamine SL milliGRAM(s) SubLingual every 6 hours  acetaminophen  IVPB. milliGRAM(s) IV Intermittent once  heparin  Injectable Unit(s) SubCutaneous every 8 hours  lactated ringers. milliLiter(s) IV Continuous <Continuous>  ondansetron Injectable milliGRAM(s) IV Push every 6 hours  aluminum hydroxide/magnesium hydroxide/simethicone Suspension milliLiter(s) Oral every 4 hours PRN  pantoprazole  Injectable milliGRAM(s) IV Push daily  HYDROmorphone  Injectable milliGRAM(s) IV Push every 10 minutes PRN

## 2017-08-24 NOTE — DIETITIAN INITIAL EVALUATION ADULT. - ENERGY NEEDS
Ht: 68“-stated, Wt: 267.4lbs- presurgical, BMI: 40.7 kg/m2, IBW: 140 lbs (+/-10%), %IBW: 191%  no edema or pressure injuries

## 2017-08-24 NOTE — DIETITIAN INITIAL EVALUATION ADULT. - OTHER INFO
Consult received for bariatric surgery. Per chart pt has tried multiple wt loss attempts. Per outpatient RD note on 6/19/17 pt weighed 289.6  pounds and has had multiple wt loss attempts mostly by [place] diet, Phentermine and watching portion sizes but was unable to lose a significant amount of wt and keep it off. Pt reports weighing 275 pounds before going on the 2 week full liquid diet and her presurgical wt was 267.4 pounds. Pt now S/P laparoscopic vertical sleeve gastrectomy and umbilical hernia repair. Pt denies nausea/vomiting, sipping clear liquid bariatric diet at time of visit. Pt with knowledge of bariatric full liquid diet however receptive to in depth review/reinforcement. Pt states having some protein shakes at home and plans to purchase more. Pt states she also purchased some of the necessary vitamins. Pt was advised to purchase chewable/liquid multivitamin with added elemental iron, chewable/liquid calcium citrate with vitamin D and sublingual B12. Pt was advised to take the chewable/liquid multivitamin with added elemental iron at least 2 hours apart from the chewable/liquid calcium citrate with vitamin D for optimal absorption. Pt states she plans to schedule a follow up appointment with outpatient RD. Pt able to teach back all points discussed during interview.

## 2017-08-24 NOTE — CHART NOTE - NSCHARTNOTEFT_GEN_A_CORE
Called cardiology consult, Dr. Barrera at [phone].  Called to eval bradycardia overnight.    MALIK Zaman PA-C, [id number]

## 2017-08-24 NOTE — DISCHARGE NOTE ADULT - CARE PLAN
Principal Discharge DX:	Morbid obesity due to excess calories  Goal:	Pt will continue healthy dietary practices for improve quality of life.  Instructions for follow-up, activity and diet:	call office for nasuea vomiting, fever, abdominal pain, redness and or drainage from abdominal surgical sites. if you experience shortness of breath, chest pain, calf pain with swelling please visit your nearest emergency room.

## 2017-09-11 ENCOUNTER — APPOINTMENT (OUTPATIENT)
Dept: SURGERY | Facility: CLINIC | Age: 37
End: 2017-09-11
Payer: COMMERCIAL

## 2017-09-11 DIAGNOSIS — E66.01 MORBID (SEVERE) OBESITY DUE TO EXCESS CALORIES: ICD-10-CM

## 2017-09-11 PROCEDURE — 99024 POSTOP FOLLOW-UP VISIT: CPT

## 2017-11-27 ENCOUNTER — APPOINTMENT (OUTPATIENT)
Dept: SURGERY | Facility: CLINIC | Age: 37
End: 2017-11-27

## 2017-12-22 ENCOUNTER — APPOINTMENT (OUTPATIENT)
Dept: SURGERY | Facility: CLINIC | Age: 37
End: 2017-12-22

## 2018-04-20 ENCOUNTER — TRANSCRIPTION ENCOUNTER (OUTPATIENT)
Age: 38
End: 2018-04-20

## 2020-09-22 NOTE — H&P PST ADULT - RESPIRATORY
Detail Level: Detailed Breath Sounds equal & clear to percussion & auscultation, no accessory muscle use

## 2023-02-16 NOTE — H&P PST ADULT - NS PRO FEM REPRO PAP RESULTS
BELONGINGS with patient: 1 pair of red underwear, 1 black t-shirt    In security: 1 cell phone in black case, 1 white charging cable, 1 set of keys, 2.5 cigarettes, 1 sealed film of Suboxone    In locker: 1 , 1 pair of ripped jeans, 1 black zip-up jacket, business card    In bin: None    Money: $1.25    Belongings were washed at the patient's request and provided to the patient on unit or placed in the locker room, as above.    Belongings checked in by:                                                                                   Date: 05/18/21    Reviewed and discussed belongings list with the patient on admission.                                                                                 Date:    Reviewed and discussed belongings list with the patient on discharge.                                                                                 Date:    [facility] is not responsible for any personal items/belongings that I choose to keep in my possession during my hospitalization. I understand that any belongings which are not logged on this form are considered to be in my possession and are my responsibility.  
[facility] Physician [address]  Scheduled Appointment: 03/01/2023    
06/2017/normal

## 2025-07-30 NOTE — H&P PST ADULT - CENTRAL VENOUS CATHETER
lisdexamfetamine (Vyvanse) 50 MG capsule         Sig: Take 1 capsule by mouth every morning.    Disp: 30 capsule    Refills: 0    Start: 7/30/2025 - 8/29/2025    Earliest Fill Date: 7/30/2025    Class: Eprescribe    PDMP Review May Be Needed    For: Attention deficit hyperactivity disorder (ADHD), unspecified ADHD type    Last ordered: 4 weeks ago (7/1/2025) by Petr Hernandez MD    Controlled Substances Refill Protocol - 12 Month Protocol - ALWAYS forward to ordering provider [id number]/30/2025 08:26 AM   Protocol Details FORWARD TO PROVIDER - Refill requests for these medications must ALWAYS be forwarded to the ordering provider, even if all criteria are met    PDMP has been reviewed in last 24 hours    Seen by prescribing provider or same department within the last 6 months or has a future appt in 6 months - IF FAILED PLEASE LOOK AT CHART REVIEW FOR LAST VISIT AND PROCEED ACCORDINGLY    Patient is not pregnant    Medication (including dose and sig) on current med list    Urine/serum drug screen on file in the appropriate time frame    Active/up-to-date controlled substances agreement/consent on file      To be filled at: [id number] [place] [id number] [address] [facility] [address]            last ov: 01/06/25  Next ov: none     PDMP reviewed   Lisdexamfetamine Dimesylate  [id number]  50MG / Capsule  Rx# [id number] Stimulant Qty: 30  Days: 30  Refills: 0 Prescribed: 7/1/2025  Dispensed: 7/3/2025  Sold: 7/3/2025 PETR HERNANDEZ  [id number] [place] [address]     Routed to provider for approval of refill       
no